# Patient Record
Sex: MALE | Race: WHITE | NOT HISPANIC OR LATINO | URBAN - METROPOLITAN AREA
[De-identification: names, ages, dates, MRNs, and addresses within clinical notes are randomized per-mention and may not be internally consistent; named-entity substitution may affect disease eponyms.]

---

## 2024-05-14 ENCOUNTER — OFFICE (OUTPATIENT)
Dept: URBAN - METROPOLITAN AREA CLINIC 67 | Facility: CLINIC | Age: 74
End: 2024-05-14

## 2024-05-14 VITALS
DIASTOLIC BLOOD PRESSURE: 74 MMHG | HEIGHT: 72 IN | HEART RATE: 58 BPM | WEIGHT: 174 LBS | SYSTOLIC BLOOD PRESSURE: 122 MMHG | RESPIRATION RATE: 14 BRPM

## 2024-05-14 DIAGNOSIS — Z12.11 ENCOUNTER FOR SCREENING FOR MALIGNANT NEOPLASM OF COLON: ICD-10-CM

## 2024-05-14 DIAGNOSIS — R13.14 DYSPHAGIA, PHARYNGOESOPHAGEAL PHASE: ICD-10-CM

## 2024-05-14 PROCEDURE — 99204 OFFICE O/P NEW MOD 45 MIN: CPT | Performed by: INTERNAL MEDICINE

## 2024-05-14 NOTE — SERVICENOTES
I will get a copy of his barium esophagram and VFSS to review - in the meantime, we will plan  for an EGD to ensure no esophageal luminal abnormalities (will also take biopsies for EoE) and we discussed the plan for a neck CT if his EGD is unremarkable.
We discussed doing some type of CRC screening given his last colonoscopy was 10yrs ago and he would like to do a Cologuard - I will order that today.

## 2024-05-14 NOTE — SERVICEHPINOTES
Elton Samano   is seen for an initial visit today. 
br
nicole    He relates at least a several month history of dysphagia that he localizes to the right side of his neck/upper esophagus - it occurs primarily with solids and cold liquids but he denies any emesis, GI tract bleeding symptoms or unexplained weight loss. He reports only rare reflux/heartburn symptoms and per his report both a barium esophagram and VFSS (based on his description) done in [place] were unremarkable but I do not have a copy of those reports to review myself. He also has seen an ENT ([place]) and neurologist but both evaluations were overall unremarkable. 
nicole rivera Blood work done through Dr. Sena's office on 4/2/24 demonstrated a normal CBC, liver enzymes and TSH.

## 2024-05-17 ENCOUNTER — OFFICE (OUTPATIENT)
Dept: URBAN - METROPOLITAN AREA PATHOLOGY 10 | Facility: PATHOLOGY | Age: 74
End: 2024-05-17
Payer: COMMERCIAL

## 2024-05-17 ENCOUNTER — AMBULATORY SURGICAL CENTER (OUTPATIENT)
Dept: URBAN - METROPOLITAN AREA SURGERY 19 | Facility: SURGERY | Age: 74
End: 2024-05-17
Payer: COMMERCIAL

## 2024-05-17 DIAGNOSIS — K21.00 GASTRO-ESOPHAGEAL REFLUX DISEASE WITH ESOPHAGITIS, WITHOUT B: ICD-10-CM

## 2024-05-17 DIAGNOSIS — K22.70 BARRETT'S ESOPHAGUS WITHOUT DYSPLASIA: ICD-10-CM

## 2024-05-17 LAB
RELEVANT H&P ENDOSCOPY: (no result)
RELEVANT H&P ENDOSCOPY: (no result)

## 2024-05-17 PROCEDURE — 88305 TISSUE EXAM BY PATHOLOGIST: CPT | Mod: TC | Performed by: STUDENT IN AN ORGANIZED HEALTH CARE EDUCATION/TRAINING PROGRAM

## 2024-05-17 PROCEDURE — 43239 EGD BIOPSY SINGLE/MULTIPLE: CPT | Performed by: INTERNAL MEDICINE
